# Patient Record
Sex: FEMALE | Race: WHITE | NOT HISPANIC OR LATINO | URBAN - METROPOLITAN AREA
[De-identification: names, ages, dates, MRNs, and addresses within clinical notes are randomized per-mention and may not be internally consistent; named-entity substitution may affect disease eponyms.]

---

## 2023-03-23 DIAGNOSIS — Z12.11 COLON CANCER SCREENING: Primary | ICD-10-CM

## 2023-03-28 DIAGNOSIS — Z12.11 COLON CANCER SCREENING: Primary | ICD-10-CM

## 2023-03-28 NOTE — TELEPHONE ENCOUNTER
----- Message from Shala Santiago sent at 3/28/2023  1:36 PM CDT -----  Regarding: Colonoscopy  Contact: patient  Per phone call with patient, she stated that she has been waiting on a return call for an appointment to have a colonoscopy to be done and no one has return her called.  The caller is wanting to make a compliant because she thinks that it is unprofessional for no one to call her back.  Please return call at [phone].    Thanks,  SJ

## 2023-04-10 VITALS — WEIGHT: 149 LBS | BODY MASS INDEX: 27.42 KG/M2 | HEIGHT: 62 IN

## 2023-04-10 RX ORDER — SEMAGLUTIDE 1.34 MG/ML
10 INJECTION, SOLUTION SUBCUTANEOUS
COMMUNITY

## 2023-04-10 RX ORDER — SPIRONOLACTONE 50 MG/1
50 TABLET, FILM COATED ORAL
COMMUNITY
Start: 2023-02-24

## 2023-04-10 RX ORDER — MELOXICAM 15 MG/1
TABLET ORAL
COMMUNITY
Start: 2023-03-14

## 2023-04-10 RX ORDER — PROGESTERONE 200 MG/1
200 CAPSULE ORAL NIGHTLY
COMMUNITY
Start: 2023-02-06

## 2023-04-10 RX ORDER — VALACYCLOVIR HYDROCHLORIDE 1 G/1
TABLET, FILM COATED ORAL
COMMUNITY
Start: 2023-03-27

## 2023-04-10 RX ORDER — LEVOTHYROXINE, LIOTHYRONINE 38; 9 UG/1; UG/1
60 TABLET ORAL
COMMUNITY
Start: 2023-02-06 | End: 2023-09-25

## 2023-04-10 NOTE — TELEPHONE ENCOUNTER
Returned call to pt and got her chart updated and scheduled. Pt voiced no hx of cpap, heart steat, or walking problem. Pt voiced understood information will be emailed and [email or web address].

## 2023-04-18 RX ORDER — SOD SULF/POT CHLORIDE/MAG SULF 1.479 G
12 TABLET ORAL DAILY
Qty: 24 TABLET | Refills: 0 | Status: SHIPPED | OUTPATIENT
Start: 2023-04-18 | End: 2023-09-25

## 2023-06-01 ENCOUNTER — TELEPHONE (OUTPATIENT)
Dept: HEMATOLOGY/ONCOLOGY | Facility: CLINIC | Age: 53
End: 2023-06-01
Payer: COMMERCIAL

## 2023-06-01 NOTE — TELEPHONE ENCOUNTER
Called the patient regarding referral. Patient did not answer. I was able to leave a VM requesting a call back. Direct number provided. TTRN

## 2023-06-06 ENCOUNTER — TELEPHONE (OUTPATIENT)
Dept: HEMATOLOGY/ONCOLOGY | Facility: CLINIC | Age: 53
End: 2023-06-06
Payer: COMMERCIAL

## 2023-06-06 NOTE — TELEPHONE ENCOUNTER
Called the patient regarding referral. Patient did not answer. I left a VM requesting a call back. Both my number as well as the clinic number was provided. TTRN

## 2023-06-15 VITALS — WEIGHT: 149 LBS | BODY MASS INDEX: 27.42 KG/M2 | HEIGHT: 62 IN

## 2023-06-15 DIAGNOSIS — Z12.11 COLON CANCER SCREENING: Primary | ICD-10-CM

## 2023-06-15 NOTE — PROGRESS NOTES
"[facility]  [address] [place] [address]  Phone: [phone]  Fax: [phone]    History & Physical         Provider: Dr. Loyda George    Patient Name: Cindy RUELAS (age):1970  52 y.o.           Gender: female   Phone: [phone]     Referring Physician: Amara Aranda (Inactive)     Vital Signs:   Height - 5' 2"  Weight - 149 lb  BMI -  27.25    Plan: Colonoscopy     Encounter Diagnosis   Name Primary?    Colon cancer screening Yes           History:      Past Medical History:   Diagnosis Date    ADD (attention deficit disorder)     BMI 27.0-27.9,adult     Disorder of thyroid, unspecified     Fever blister     IBS (irritable bowel syndrome)     Mixed hyperlipidemia     Neck pain     Type 2 diabetes mellitus with unspecified diabetic retinopathy without macular edema     Weight loss       Past Surgical History:   Procedure Laterality Date    APPENDECTOMY      BREAST SURGERY      FOOT SURGERY      HYSTERECTOMY      rotoar cuff & biceps repair Right     tumor on face         Medication List with Changes/Refills   Current Medications    MELOXICAM (MOBIC) 15 MG TABLET    TAKE 1 TABLET BY MOUTH ONCE DAILY WITH MEALS    NP THYROID 60 MG TAB    Take 60 mg by mouth.    PROGESTERONE (PROMETRIUM) 200 MG CAPSULE    Take 200 mg by mouth every evening. take at bedtime    SEMAGLUTIDE (OZEMPIC) 1 MG/DOSE (2 MG/1.5 ML) PNIJ    Inject 10 mg into the skin every 7 days.    SOD SULF-POT CHLORIDE-MAG SULF (SUTAB) 1.479-0.188- 0.225 GRAM TABLET    Take 12 tablets by mouth once daily. Take according to package instructions with indicated amount of water. No breakfast day before test. May substitute with Suprep, Clenpiq, Plenvu, Moviprep or GoLytely based on Rx plan and patient preference.    SPIRONOLACTONE (ALDACTONE) 50 MG TABLET    Take 50 mg by mouth.    VALACYCLOVIR (VALTREX) 1000 MG TABLET        "   Review of patient's allergies indicates:   Allergen Reactions    Valium [diazepam] Other (See Comments)     Critical she states she flipped out       Family History   Problem Relation Age of Onset    No Known Problems Father     No Known Problems Sister     No Known Problems Sister     No Known Problems Sister     No Known Problems Sister     No Known Problems Sister     No Known Problems Sister     No Known Problems Brother     No Known Problems Brother     No Known Problems Brother     No Known Problems Brother     No Known Problems Maternal Grandmother     No Known Problems Maternal Grandfather     No Known Problems Paternal Grandmother     No Known Problems Paternal Grandfather       Social History     Tobacco Use    Smoking status: Never    Smokeless tobacco: Never   Substance Use Topics    Alcohol use: Never    Drug use: Never        Physical Examination:     General Appearance:___________________________  HEENT: _____________________________________  Abdomen:____________________________________  Heart:________________________________________  Lungs:_______________________________________  Extremities:___________________________________  Skin:_________________________________________  Endocrine:____________________________________  Genitourinary:_________________________________  Neurological:__________________________________      Patient has been evaluated immediately prior to sedation and is medically cleared for endoscopy with IVCS as an ASA class: ______      Physician Signature: _________________________       Date: ________  Time: ________

## 2023-09-12 ENCOUNTER — TELEPHONE (OUTPATIENT)
Dept: HEMATOLOGY/ONCOLOGY | Facility: CLINIC | Age: 53
End: 2023-09-12

## 2023-09-12 ENCOUNTER — OFFICE VISIT (OUTPATIENT)
Dept: HEMATOLOGY/ONCOLOGY | Facility: CLINIC | Age: 53
End: 2023-09-12
Payer: COMMERCIAL

## 2023-09-12 VITALS
DIASTOLIC BLOOD PRESSURE: 85 MMHG | RESPIRATION RATE: 18 BRPM | HEART RATE: 92 BPM | BODY MASS INDEX: 25.03 KG/M2 | HEIGHT: 62 IN | WEIGHT: 136 LBS | OXYGEN SATURATION: 98 % | SYSTOLIC BLOOD PRESSURE: 120 MMHG

## 2023-09-12 DIAGNOSIS — Z15.02 BRCA2 GENE MUTATION POSITIVE IN FEMALE: Primary | ICD-10-CM

## 2023-09-12 DIAGNOSIS — Z15.09 BRCA2 GENE MUTATION POSITIVE IN FEMALE: Primary | ICD-10-CM

## 2023-09-12 DIAGNOSIS — Z15.01 BRCA2 GENE MUTATION POSITIVE IN FEMALE: Primary | ICD-10-CM

## 2023-09-12 PROCEDURE — 3008F BODY MASS INDEX DOCD: CPT | Mod: CPTII,S$GLB,, | Performed by: NURSE PRACTITIONER

## 2023-09-12 PROCEDURE — 3079F DIAST BP 80-89 MM HG: CPT | Mod: CPTII,S$GLB,, | Performed by: NURSE PRACTITIONER

## 2023-09-12 PROCEDURE — 1159F PR MEDICATION LIST DOCUMENTED IN MEDICAL RECORD: ICD-10-PCS | Mod: CPTII,S$GLB,, | Performed by: NURSE PRACTITIONER

## 2023-09-12 PROCEDURE — 1160F RVW MEDS BY RX/DR IN RCRD: CPT | Mod: CPTII,S$GLB,, | Performed by: NURSE PRACTITIONER

## 2023-09-12 PROCEDURE — 99205 PR OFFICE/OUTPT VISIT, NEW, LEVL V, 60-74 MIN: ICD-10-PCS | Mod: S$GLB,,, | Performed by: NURSE PRACTITIONER

## 2023-09-12 PROCEDURE — 3008F PR BODY MASS INDEX (BMI) DOCUMENTED: ICD-10-PCS | Mod: CPTII,S$GLB,, | Performed by: NURSE PRACTITIONER

## 2023-09-12 PROCEDURE — 3074F PR MOST RECENT SYSTOLIC BLOOD PRESSURE < 130 MM HG: ICD-10-PCS | Mod: CPTII,S$GLB,, | Performed by: NURSE PRACTITIONER

## 2023-09-12 PROCEDURE — 1159F MED LIST DOCD IN RCRD: CPT | Mod: CPTII,S$GLB,, | Performed by: NURSE PRACTITIONER

## 2023-09-12 PROCEDURE — 99205 OFFICE O/P NEW HI 60 MIN: CPT | Mod: S$GLB,,, | Performed by: NURSE PRACTITIONER

## 2023-09-12 PROCEDURE — 3074F SYST BP LT 130 MM HG: CPT | Mod: CPTII,S$GLB,, | Performed by: NURSE PRACTITIONER

## 2023-09-12 PROCEDURE — 3079F PR MOST RECENT DIASTOLIC BLOOD PRESSURE 80-89 MM HG: ICD-10-PCS | Mod: CPTII,S$GLB,, | Performed by: NURSE PRACTITIONER

## 2023-09-12 PROCEDURE — 1160F PR REVIEW ALL MEDS BY PRESCRIBER/CLIN PHARMACIST DOCUMENTED: ICD-10-PCS | Mod: CPTII,S$GLB,, | Performed by: NURSE PRACTITIONER

## 2023-09-12 NOTE — LETTER
September 13, 2023        Amara Aranda MD  [facility] [address] [place] [address]             [facility]  [address]  [place] [address]  Phone: [phone]  Fax: [phone]   Patient: Cindy Novoa   MR Number: [id number]   YOB: 1970   Date of Visit: 9/12/2023       Dear Dr. Aranda:    Thank you for referring Cindy Novoa to me for evaluation. Attached you will find relevant portions of my assessment and plan of care.    If you have questions, please do not hesitate to call me. I look forward to following Cindy Novoa along with you.    Sincerely,      Yenny Saleem, YUNIEL            CC    No Recipients    Enclosure

## 2023-09-12 NOTE — PROGRESS NOTES
Subjective:      Patient ID: Cindy Novoa is a 52 y.o. female.        Chief Complaint: No chief complaint on file.    Cindy Novoa  has been referred to our clinic today to discuss results of recent [employer] genetic testing done on 4/21/2023. Results of testing show a BRCA 2 gene mutation (c.1813dupA) which is associated with the hereditary Breast and Ovarian Cancer Syndrome (HBOC). Patient states she has a strong family history of male breast cancer and prostate in her maternal grandfather, and maternal uncle as well as breast and  ovarian cancer in her maternal grandmother, which lead to her testing.     Today we discussed her associated risks for developing breast, ovarian, melanoma and pancreatic cancers linked to BRCA 2 mutations.       IMAGING:      Review of systems:  Review of Systems   Constitutional:  Negative for activity change, appetite change, chills, diaphoresis, fatigue, fever and unexpected weight change.   HENT:  Negative for congestion, dental problem, mouth sores, nosebleeds, sore throat, tinnitus and voice change.    Eyes:  Negative for photophobia, discharge and visual disturbance.   Respiratory:  Negative for apnea, cough, choking, chest tightness, shortness of breath, wheezing and stridor.    Cardiovascular:  Negative for chest pain, palpitations and leg swelling.   Gastrointestinal:  Negative for abdominal distention, abdominal pain, anal bleeding, blood in stool, constipation, diarrhea, nausea, rectal pain and vomiting.   Endocrine: Negative for cold intolerance and heat intolerance.   Genitourinary:  Negative for difficulty urinating, dysuria, hematuria, menstrual problem, vaginal bleeding, vaginal discharge and vaginal pain.   Musculoskeletal:  Negative for arthralgias, back pain, gait problem, joint swelling and myalgias.   Skin:  Negative for color change, pallor, rash and wound.   Neurological:  Negative for dizziness, syncope, light-headedness and numbness.    Hematological:  Negative for adenopathy. Does not bruise/bleed easily.   Psychiatric/Behavioral:  Negative for agitation, confusion, sleep disturbance and suicidal ideas. The patient is not nervous/anxious.        Objective:     Physical Exam  Constitutional:       Appearance: She is well-developed.   HENT:      Head: Normocephalic.   Eyes:      Conjunctiva/sclera: Conjunctivae normal.      Pupils: Pupils are equal, round, and reactive to light.   Cardiovascular:      Rate and Rhythm: Normal rate and regular rhythm.      Heart sounds: Normal heart sounds.   Pulmonary:      Effort: Pulmonary effort is normal.      Breath sounds: Normal breath sounds.   Chest:      Chest wall: No mass, deformity or tenderness.   Breasts:     Breasts are symmetrical.   Abdominal:      General: Bowel sounds are normal.      Palpations: Abdomen is soft.   Musculoskeletal:         General: Normal range of motion.      Cervical back: Normal range of motion and neck supple.   Skin:     General: Skin is warm and dry.   Neurological:      Mental Status: She is alert and oriented to person, place, and time.   Psychiatric:         Behavior: Behavior normal.         Thought Content: Thought content normal.         Judgment: Judgment normal.       Vitals:    09/12/23 1525   BP: 120/85   Pulse: 92   Resp: 18           Assessment:      1. BRCA2 gene mutation positive in female           Plan:   BRCA 2 related breast cancer risk Lifetime risk  43-84%  Recommend Breast MRI beginning at age 25, will mammograms beginning at age 30.She is up to date on mammogram from 4/2023, cat 2  CBE beginning at age 27   Consider risk reducing mastectomy  Discussed risk reducing chemo prevention guidelines including anti hormonal therapy with tamoxifen.        BRCA2 related ovarian cancer risk Lifetime risk 15-27%  Recommended  TV ultrasound and Ca 125 monitoring yearly beginning at age 30-35  Recommend bilateral salpingo-oophorectomy vs oral contraceptives upon  the completion of childbearing History of partial hysterectomy  Consideer other risk reducing agents like oral contraceptives       3.  BRCA2 related pancreatic cancer risk 7%              A. discuss EUS/MRCP yearly screening for pancreatic cancer to begin at age 50.                B. Encouraged to reduce  pancreatic cancer risk by not smoking and maintaining a healthy weight     4. BRCA2 related skin cancer risk              A. Will refer patient to Dermatology for yearly skin evaluation Seen by [facility]               B. Advised to follow-up with ophthalmology for yearly eye examination seen by eye clinic              C. Encouraged to limit UV exposure  6. Genetic testing for family members recommended    Orders:  Refer for bilateral Breast MRI  Refer to GYN ONC to discuss surgical options  Transvaginal U/S ordered  Cbc Cmp Ca 125   Will refer to GI specialist of choice at next appointment  She is instructed to stop progesterone cream and estradiol pellet at this time. If she proceeds with complete hysterectomy she may resume HRT afterwards.     RTC 1 month with MRI and labs      -Total time spent in counseling and discussion about further management options including relevant lab work, treatment,  prognosis, medications and intended side effects was more than 60 minutes. More than 50% of the time was spent on counseling and coordination of care.  This includes face to face time and non-face to face time preparing to see the patient (eg, review of tests), Obtaining and/or reviewing separately obtained history, Documenting clinical information in the electronic or other health record, Independently interpreting resultsand communicating results to the patient/family/caregiver, or Care coordination.     Yenny Saleem, WARD, JULY

## 2023-09-13 ENCOUNTER — TELEPHONE (OUTPATIENT)
Dept: HEMATOLOGY/ONCOLOGY | Facility: CLINIC | Age: 53
End: 2023-09-13
Payer: COMMERCIAL

## 2023-09-15 ENCOUNTER — TELEPHONE (OUTPATIENT)
Dept: HEMATOLOGY/ONCOLOGY | Facility: CLINIC | Age: 53
End: 2023-09-15
Payer: COMMERCIAL

## 2023-09-15 NOTE — TELEPHONE ENCOUNTER
----- Message from Alma Misty sent at 9/15/2023 11:28 AM CDT -----  Contact: Patient  Patient called to consult with nurse or staff regarding her referral to see gynecology/oncologist. She states she wanted to she would like to be referred to Son instead of Maynor Leggett. She would like a call back and can be reached at [phone]. Thanks/MR

## 2023-09-17 ENCOUNTER — TELEPHONE (OUTPATIENT)
Dept: GASTROENTEROLOGY | Facility: CLINIC | Age: 53
End: 2023-09-17

## 2023-09-17 DIAGNOSIS — Z12.11 COLON CANCER SCREENING: Primary | ICD-10-CM

## 2023-09-18 NOTE — TELEPHONE ENCOUNTER
Chart note received from Hem/Onc. Patient was scheduled for screening colonoscopy but it does not seem it was completed. Okay to reschedule.  NBP

## 2023-09-20 ENCOUNTER — TELEPHONE (OUTPATIENT)
Dept: HEMATOLOGY/ONCOLOGY | Facility: CLINIC | Age: 53
End: 2023-09-20
Payer: COMMERCIAL

## 2023-09-20 DIAGNOSIS — Z15.02 BRCA2 GENE MUTATION POSITIVE IN FEMALE: Primary | ICD-10-CM

## 2023-09-20 DIAGNOSIS — Z15.09 BRCA2 GENE MUTATION POSITIVE IN FEMALE: Primary | ICD-10-CM

## 2023-09-20 DIAGNOSIS — Z15.01 BRCA2 GENE MUTATION POSITIVE IN FEMALE: Primary | ICD-10-CM

## 2023-09-21 ENCOUNTER — TELEPHONE (OUTPATIENT)
Dept: GYNECOLOGIC ONCOLOGY | Facility: CLINIC | Age: 53
End: 2023-09-21

## 2023-09-21 NOTE — NURSING
New pt referral received from Stiven BRICE for pt to be seen with GYN/ONC to discuss BRCA2+ gene mutation results. LVM with for a call back to arrange new pt appointment. Direct navigator phone number provided to pt [phone]

## 2023-09-25 VITALS — WEIGHT: 132 LBS | BODY MASS INDEX: 24.29 KG/M2 | HEIGHT: 62 IN

## 2023-09-25 RX ORDER — SOD SULF/POT CHLORIDE/MAG SULF 1.479 G
12 TABLET ORAL DAILY
Qty: 24 TABLET | Refills: 0 | Status: CANCELLED | OUTPATIENT
Start: 2023-09-25

## 2023-09-25 RX ORDER — LEVOTHYROXINE, LIOTHYRONINE 57; 13.5 UG/1; UG/1
TABLET ORAL
COMMUNITY
Start: 2023-08-05

## 2023-09-25 NOTE — TELEPHONE ENCOUNTER
Patient denied having any current problems or issues. Said she's had ibs-c for years and she uses miralax on a regular basis. Patient denied having any hx of kidney disease, sleep apnea, or seizures.     Patient takes ozempic for pre-diabetes and weight gain.    Chart was reviewed and updated with patient. Prep instructions were reviewed again. Patient advised she still have the prep instructions so I didn't need to email them to her.     Patient asked for the liquid prep. She is able to force that down better than the pills.    Colonoscopy scheduled for 12/13/2023 w/ NBP. - dmp

## 2023-09-26 RX ORDER — SODIUM, POTASSIUM,MAG SULFATES 17.5-3.13G
SOLUTION, RECONSTITUTED, ORAL ORAL
Qty: 1 KIT | Refills: 0 | Status: SHIPPED | OUTPATIENT
Start: 2023-09-26 | End: 2023-12-28

## 2023-10-06 ENCOUNTER — PATIENT MESSAGE (OUTPATIENT)
Dept: HEMATOLOGY/ONCOLOGY | Facility: CLINIC | Age: 53
End: 2023-10-06
Payer: COMMERCIAL

## 2023-10-06 ENCOUNTER — TELEPHONE (OUTPATIENT)
Dept: HEMATOLOGY/ONCOLOGY | Facility: CLINIC | Age: 53
End: 2023-10-06
Payer: COMMERCIAL

## 2023-10-11 LAB
ALBUMIN SERPL BCP-MCNC: 4.1 G/DL (ref 3.4–5)
ALBUMIN/GLOBULIN RATIO: 1.37 RATIO (ref 1.1–1.8)
ALP SERPL-CCNC: 61 U/L (ref 46–116)
ALT SERPL W P-5'-P-CCNC: 26 U/L (ref 12–78)
ANION GAP SERPL CALC-SCNC: 5 MMOL/L (ref 3–11)
AST SERPL-CCNC: 26 U/L (ref 15–37)
BASOPHILS NFR BLD: 0.3 % (ref 0–3)
BILIRUB SERPL-MCNC: 0.8 MG/DL (ref 0–1)
BUN SERPL-MCNC: 13 MG/DL (ref 7–18)
BUN/CREAT SERPL: 22.8 RATIO (ref 7–18)
CALCIUM SERPL-MCNC: 9.5 MG/DL (ref 8.8–10.5)
CHLORIDE SERPL-SCNC: 104 MMOL/L (ref 100–108)
CO2 SERPL-SCNC: 29 MMOL/L (ref 21–32)
CREAT SERPL-MCNC: 0.57 MG/DL (ref 0.55–1.02)
EOSINOPHIL NFR BLD: 2.6 % (ref 1–3)
ERYTHROCYTE [DISTWIDTH] IN BLOOD BY AUTOMATED COUNT: 12.1 % (ref 12.5–18)
GFR ESTIMATION: > 60
GLOBULIN: 3 G/DL (ref 2.3–3.5)
GLUCOSE SERPL-MCNC: 78 MG/DL (ref 70–110)
HCT VFR BLD AUTO: 41.5 % (ref 37–47)
HGB BLD-MCNC: 13.5 G/DL (ref 12–16)
LYMPHOCYTES NFR BLD: 29.2 % (ref 25–40)
MCH RBC QN AUTO: 30.5 PG (ref 27–31.2)
MCHC RBC AUTO-ENTMCNC: 32.5 G/DL (ref 31.8–35.4)
MCV RBC AUTO: 93.7 FL (ref 80–97)
MONOCYTES NFR BLD: 8.5 % (ref 1–15)
NEUTROPHILS # BLD AUTO: 3.83 10*3/UL (ref 1.8–7.7)
NEUTROPHILS NFR BLD: 58.9 % (ref 37–80)
NUCLEATED RED BLOOD CELLS: 0 %
PLATELETS: 287 10*3/UL (ref 142–424)
POTASSIUM SERPL-SCNC: 4.5 MMOL/L (ref 3.6–5.2)
PROT SERPL-MCNC: 7.1 G/DL (ref 6.4–8.2)
RBC # BLD AUTO: 4.43 10*6/UL (ref 4.2–5.4)
SODIUM BLD-SCNC: 138 MMOL/L (ref 135–145)
WBC # BLD: 6.5 10*3/UL (ref 4.6–10.2)

## 2023-10-13 LAB — CANCER ANTIGEN (CA) 125: 7 U/ML

## 2023-10-16 NOTE — NURSING
Pt called to schedule from referral and LVM with direct navigator number [phone] for call back

## 2023-10-20 PROBLEM — Z15.01 BRCA2 GENE MUTATION POSITIVE IN FEMALE: Status: ACTIVE | Noted: 2023-10-20

## 2023-10-20 PROBLEM — Z15.09 BRCA2 GENE MUTATION POSITIVE IN FEMALE: Status: ACTIVE | Noted: 2023-10-20

## 2023-10-20 PROBLEM — Z15.02 BRCA2 GENE MUTATION POSITIVE IN FEMALE: Status: ACTIVE | Noted: 2023-10-20

## 2023-10-23 ENCOUNTER — OFFICE VISIT (OUTPATIENT)
Dept: HEMATOLOGY/ONCOLOGY | Facility: CLINIC | Age: 53
End: 2023-10-23
Payer: COMMERCIAL

## 2023-10-23 VITALS
SYSTOLIC BLOOD PRESSURE: 122 MMHG | DIASTOLIC BLOOD PRESSURE: 83 MMHG | WEIGHT: 140.5 LBS | HEART RATE: 73 BPM | OXYGEN SATURATION: 97 % | RESPIRATION RATE: 18 BRPM | HEIGHT: 62 IN | BODY MASS INDEX: 25.86 KG/M2

## 2023-10-23 DIAGNOSIS — Z15.09 BRCA2 GENE MUTATION POSITIVE IN FEMALE: Primary | ICD-10-CM

## 2023-10-23 DIAGNOSIS — Z15.02 BRCA2 GENE MUTATION POSITIVE IN FEMALE: Primary | ICD-10-CM

## 2023-10-23 DIAGNOSIS — Z15.01 BRCA2 GENE MUTATION POSITIVE IN FEMALE: Primary | ICD-10-CM

## 2023-10-23 PROCEDURE — 3079F PR MOST RECENT DIASTOLIC BLOOD PRESSURE 80-89 MM HG: ICD-10-PCS | Mod: CPTII,S$GLB,, | Performed by: NURSE PRACTITIONER

## 2023-10-23 PROCEDURE — 3074F PR MOST RECENT SYSTOLIC BLOOD PRESSURE < 130 MM HG: ICD-10-PCS | Mod: CPTII,S$GLB,, | Performed by: NURSE PRACTITIONER

## 2023-10-23 PROCEDURE — 1159F PR MEDICATION LIST DOCUMENTED IN MEDICAL RECORD: ICD-10-PCS | Mod: CPTII,S$GLB,, | Performed by: NURSE PRACTITIONER

## 2023-10-23 PROCEDURE — 1159F MED LIST DOCD IN RCRD: CPT | Mod: CPTII,S$GLB,, | Performed by: NURSE PRACTITIONER

## 2023-10-23 PROCEDURE — 1160F RVW MEDS BY RX/DR IN RCRD: CPT | Mod: CPTII,S$GLB,, | Performed by: NURSE PRACTITIONER

## 2023-10-23 PROCEDURE — 1160F PR REVIEW ALL MEDS BY PRESCRIBER/CLIN PHARMACIST DOCUMENTED: ICD-10-PCS | Mod: CPTII,S$GLB,, | Performed by: NURSE PRACTITIONER

## 2023-10-23 PROCEDURE — 3008F PR BODY MASS INDEX (BMI) DOCUMENTED: ICD-10-PCS | Mod: CPTII,S$GLB,, | Performed by: NURSE PRACTITIONER

## 2023-10-23 PROCEDURE — 99214 OFFICE O/P EST MOD 30 MIN: CPT | Mod: S$GLB,,, | Performed by: NURSE PRACTITIONER

## 2023-10-23 PROCEDURE — 3074F SYST BP LT 130 MM HG: CPT | Mod: CPTII,S$GLB,, | Performed by: NURSE PRACTITIONER

## 2023-10-23 PROCEDURE — 3008F BODY MASS INDEX DOCD: CPT | Mod: CPTII,S$GLB,, | Performed by: NURSE PRACTITIONER

## 2023-10-23 PROCEDURE — 3079F DIAST BP 80-89 MM HG: CPT | Mod: CPTII,S$GLB,, | Performed by: NURSE PRACTITIONER

## 2023-10-23 PROCEDURE — 99214 PR OFFICE/OUTPT VISIT, EST, LEVL IV, 30-39 MIN: ICD-10-PCS | Mod: S$GLB,,, | Performed by: NURSE PRACTITIONER

## 2023-10-23 NOTE — PROGRESS NOTES
Subjective:      Patient ID: Cindy Novoa is a 52 y.o. female.        Chief Complaint: BRCA2 gene mutation positive in female    Cindy Novoa  has been referred to our clinic today to discuss results of recent [employer] genetic testing done on 4/21/2023. Results of testing show a BRCA 2 gene mutation (c.1813dupA) which is associated with the hereditary Breast and Ovarian Cancer Syndrome (HBOC). Patient states she has a strong family history of male breast cancer and prostate in her maternal grandfather, and maternal uncle as well as breast and  ovarian cancer in her maternal grandmother, which lead to her testing.     Today we discussed her associated risks for developing breast, ovarian, melanoma and pancreatic cancers linked to BRCA 2 mutations.       IMAGING:      Review of systems:  Review of Systems   Constitutional:  Negative for activity change, appetite change, chills, diaphoresis, fatigue, fever and unexpected weight change.   HENT:  Negative for congestion, dental problem, mouth sores, nosebleeds, sore throat, tinnitus and voice change.    Eyes:  Negative for photophobia, discharge and visual disturbance.   Respiratory:  Negative for apnea, cough, choking, chest tightness, shortness of breath, wheezing and stridor.    Cardiovascular:  Negative for chest pain, palpitations and leg swelling.   Gastrointestinal:  Negative for abdominal distention, abdominal pain, anal bleeding, blood in stool, constipation, diarrhea, nausea, rectal pain and vomiting.   Endocrine: Negative for cold intolerance and heat intolerance.   Genitourinary:  Negative for difficulty urinating, dysuria, hematuria, menstrual problem, vaginal bleeding, vaginal discharge and vaginal pain.   Musculoskeletal:  Negative for arthralgias, back pain, gait problem, joint swelling and myalgias.   Skin:  Negative for color change, pallor, rash and wound.   Neurological:  Negative for dizziness, syncope, light-headedness and numbness.    Hematological:  Negative for adenopathy. Does not bruise/bleed easily.   Psychiatric/Behavioral:  Negative for agitation, confusion, sleep disturbance and suicidal ideas. The patient is not nervous/anxious.        Objective:     Physical Exam  Constitutional:       Appearance: She is well-developed.   HENT:      Head: Normocephalic.   Eyes:      Conjunctiva/sclera: Conjunctivae normal.      Pupils: Pupils are equal, round, and reactive to light.   Cardiovascular:      Rate and Rhythm: Normal rate and regular rhythm.      Heart sounds: Normal heart sounds.   Pulmonary:      Effort: Pulmonary effort is normal.      Breath sounds: Normal breath sounds.   Chest:      Chest wall: No mass, deformity or tenderness.   Breasts:     Breasts are symmetrical.   Abdominal:      General: Bowel sounds are normal.      Palpations: Abdomen is soft.   Musculoskeletal:         General: Normal range of motion.      Cervical back: Normal range of motion and neck supple.   Skin:     General: Skin is warm and dry.   Neurological:      Mental Status: She is alert and oriented to person, place, and time.   Psychiatric:         Behavior: Behavior normal.         Thought Content: Thought content normal.         Judgment: Judgment normal.       Vitals:    10/23/23 1525   BP: 122/83   Pulse: 73   Resp: 18             Assessment:      1. BRCA2 gene mutation positive in female               Plan:   BRCA 2 related breast cancer risk Lifetime risk  43-84%  Recommend Breast MRI beginning at age 25, will mammograms beginning at age 30.She is up to date on mammogram from 4/2023, cat 2  CBE beginning at age 25   Consider risk reducing mastectomy  Discussed risk reducing chemo prevention guidelines including anti hormonal therapy with tamoxifen.        BRCA2 related ovarian cancer risk Lifetime risk 15-27%  Recommended  TV ultrasound and Ca 125 monitoring yearly beginning at age 30-35  Recommend bilateral salpingo-oophorectomy vs oral contraceptives  upon the completion of childbearing History of partial hysterectomy  Consideer other risk reducing agents like oral contraceptives       3.  BRCA2 related pancreatic cancer risk 7%              A. discuss EUS/MRCP yearly screening for pancreatic cancer to begin at age 50.                B. Encouraged to reduce  pancreatic cancer risk by not smoking and maintaining a healthy weight     4. BRCA2 related skin cancer risk              A. Will refer patient to Dermatology for yearly skin evaluation Seen by [facility]               B. Advised to follow-up with ophthalmology for yearly eye examination seen by eye clinic              C. Encouraged to limit UV exposure  5. Genetic testing for family members recommended    Orders:  Refer for bilateral Breast MRI, pending   Refer to GYN ONC to discuss surgical options  Transvaginal U/S ordered  Cbc Cmp Ca 125   Will refer to GI specialist of choice at next appointment c-scope on 12/13/23 with Dr George   She is instructed to stop progesterone cream and estradiol pellet at this time. If she proceeds with complete hysterectomy she may resume HRT afterwards.     RTC 2 month after 2 months   Planned for [facility] appt on 11/7/23 with gyn onc     -Total time spent in counseling and discussion about further management options including relevant lab work, treatment,  prognosis, medications and intended side effects was more than 60 minutes. More than 50% of the time was spent on counseling and coordination of care.  This includes face to face time and non-face to face time preparing to see the patient (eg, review of tests), Obtaining and/or reviewing separately obtained history, Documenting clinical information in the electronic or other health record, Independently interpreting resultsand communicating results to the patient/family/caregiver, or Care coordination.     WARD Mai, JULY

## 2023-11-14 ENCOUNTER — TELEPHONE (OUTPATIENT)
Dept: HEMATOLOGY/ONCOLOGY | Facility: CLINIC | Age: 53
End: 2023-11-14
Payer: COMMERCIAL

## 2023-11-14 NOTE — TELEPHONE ENCOUNTER
Spoke with pt, canceled appt           ----- Message from Shala Santiago sent at 11/14/2023  2:30 PM CST -----  Regarding: Cancellation  Contact: Patient  Per phone call with patient, she stated to cancel the appointment on 12/27/2023 because she will be seen at [facility] on 12/01/23 and 12/05/2023 and she will call with follow up.  Please return call at [phone] (home).    Thanks,  SJ

## 2023-11-22 NOTE — TELEPHONE ENCOUNTER
Navigator notes in chart that pt seeking care at [facility]. Referral closed

## 2023-12-04 ENCOUNTER — TELEPHONE (OUTPATIENT)
Dept: GASTROENTEROLOGY | Facility: CLINIC | Age: 53
End: 2023-12-04
Payer: COMMERCIAL

## 2023-12-04 VITALS — BODY MASS INDEX: 25.76 KG/M2 | HEIGHT: 62 IN | WEIGHT: 140 LBS

## 2023-12-04 DIAGNOSIS — Z12.11 COLON CANCER SCREENING: Primary | ICD-10-CM

## 2023-12-04 NOTE — TELEPHONE ENCOUNTER
[facility]  [address] [place] [address]  Phone: [phone]  Fax: [phone]    History & Physical         Provider: Dr. Loyda George    Patient Name: Cindy RUELAS (age):1970  53 y.o.           Gender: female   Phone: [phone]     Referring Physician: Amara Aranda (Inactive)     Vital Signs:   Height - 5'2''  Weight - 140 LB  BMI -  25.61    Plan: Colonoscopy @ [facility]     Encounter Diagnosis   Name Primary?    Colon cancer screening Yes           History:      Past Medical History:   Diagnosis Date    BMI 24.14 2023    BRCA2 gene mutation positive     c.1813dupA    Disorder of thyroid, unspecified     Fever blister     Hormone imbalance     IBS (irritable bowel syndrome)     Mixed hyperlipidemia     Neck pain     Pre-diabetes       Past Surgical History:   Procedure Laterality Date    APPENDECTOMY      AUGMENTATION OF BREAST      and in  - one had ruptured    BREAST SURGERY      FOOT SURGERY  1987    HYSTERECTOMY      rotoar cuff & biceps repair Right 2022    tumor on face         Medication List with Changes/Refills   Current Medications    MELOXICAM (MOBIC) 15 MG TABLET    TAKE 1 TABLET BY MOUTH ONCE DAILY WITH MEALS    NP THYROID 90 MG TAB    TAKE 1 TABLET BY MOUTH IN THE MORNING ON AN EMPTY STOMACH    PROGESTERONE (PROMETRIUM) 200 MG CAPSULE    Take 200 mg by mouth every evening. take at bedtime    SEMAGLUTIDE (OZEMPIC) 1 MG/DOSE (2 MG/1.5 ML) PNIJ    Inject 10 mg into the skin every 7 days.    SODIUM,POTASSIUM,MAG SULFATES (SUPREP BOWEL PREP KIT) 17.5-3.13-1.6 GRAM SOLR    Take according to kit instructions but DO NOT eat breakfast the morning of procedure.    SPIRONOLACTONE (ALDACTONE) 50 MG TABLET    Take 50 mg by mouth.    VALACYCLOVIR (VALTREX) 1000 MG TABLET          Review of patient's allergies indicates:   Allergen Reactions    Valium  [diazepam] Other (See Comments)     Critical she states she flipped out       Family History   Problem Relation Age of Onset    Heart attack Father     No Known Problems Sister     No Known Problems Sister     No Known Problems Sister     No Known Problems Sister     No Known Problems Sister     No Known Problems Sister     No Known Problems Brother     No Known Problems Brother     No Known Problems Brother     No Known Problems Brother     No Known Problems Maternal Grandmother     No Known Problems Maternal Grandfather     No Known Problems Paternal Grandmother     No Known Problems Paternal Grandfather       Social History     Tobacco Use    Smoking status: Never    Smokeless tobacco: Never   Substance Use Topics    Alcohol use: Yes     Comment: seldomly    Drug use: Never        Physical Examination:     General Appearance:___________________________  HEENT: _____________________________________  Abdomen:____________________________________  Heart:________________________________________  Lungs:_______________________________________  Extremities:___________________________________  Skin:_________________________________________  Endocrine:____________________________________  Genitourinary:_________________________________  Neurological:__________________________________      Patient has been evaluated immediately prior to sedation and is medically cleared for endoscopy with IVCS as an ASA class: ______      Physician Signature: _________________________       Date: ________  Time: ________

## 2023-12-11 NOTE — TELEPHONE ENCOUNTER
S/w pt and told her that I was calling as a courtesy regarding her upcoming Colon with NBP on 12/12/13 Wed and wanted to verify that he has his prep instructions and meds. Pt stated she both. I also reminded her to not take Ozempic. Pt last injection was last Monday.  I also mentioned that [facility]  will call on Tues with the arrival time. dayanara

## 2023-12-18 ENCOUNTER — PATIENT MESSAGE (OUTPATIENT)
Dept: GASTROENTEROLOGY | Facility: CLINIC | Age: 53
End: 2023-12-18
Payer: COMMERCIAL

## 2023-12-18 NOTE — TELEPHONE ENCOUNTER
We do not have any availability before that date. She should know that we are booking in 5/2024 for procedures. We can always put her on a cancellation list if something opens up. If she wants to have an OV to discuss her GI/IBS-C symptoms she may have that as well.   ANJU

## 2023-12-27 NOTE — PROGRESS NOTES
Clinic Note    Reason for visit:  The primary encounter diagnosis was Irritable bowel syndrome with constipation. A diagnosis of Colon cancer screening was also pertinent to this visit.    PCP: Amara Aranda (Inactive)       HPI:  This is a 53 y.o. female who is established. [place] with IBS-C. Patients mother passed so was unable to have colonoscopy. Had IBS-D for years but now has consipation. Takes laxatative and stool softners daily. She is aware of certain foods that may trigger IBS symptoms. Rice and bread is a trigger. She also will have severe bloating, indigestion if does not have BM daily. She will often have to vomit for relief. No blood in stool.    She is BRCA2+. Had MRI showing 0.3cm focus enhancement to R Breast. She goes for biopsy next week.     Did see Gunjan years ago for IBS C Had colonoscopy which was normal      Review of Systems   Constitutional:  Negative for fatigue, fever and unexpected weight change.   HENT:  Negative for mouth sores, postnasal drip, sore throat and trouble swallowing.    Eyes:  Negative for pain, discharge and eye dryness.   Respiratory:  Negative for apnea, cough, choking, chest tightness, shortness of breath and wheezing.    Cardiovascular:  Negative for chest pain, palpitations and leg swelling.   Gastrointestinal:  Positive for constipation and reflux. Negative for abdominal distention, abdominal pain, anal bleeding, blood in stool, change in bowel habit, diarrhea, nausea, rectal pain, vomiting and fecal incontinence.   Genitourinary:  Negative for bladder incontinence, dysuria and hematuria.   Musculoskeletal:  Negative for arthralgias, back pain and joint swelling.   Integumentary:  Negative for color change and rash.   Allergic/Immunologic: Negative for environmental allergies and food allergies.   Neurological:  Negative for seizures and headaches.   Hematological:  Negative for adenopathy. Does not bruise/bleed easily.        Past Medical History:    Diagnosis Date    BMI 24.14 09/2023    BRCA2 gene mutation positive     [id number]    Disorder of thyroid, unspecified     Fever blister     Hormone imbalance     IBS (irritable bowel syndrome)     Mixed hyperlipidemia     Neck pain     Pre-diabetes      Past Surgical History:   Procedure Laterality Date    APPENDECTOMY  1987    AUGMENTATION OF BREAST  2001    and in 2009 - one had ruptured    BREAST SURGERY      COLONOSCOPY      FOOT SURGERY Bilateral 1987    HYSTERECTOMY  2007    partial    rotoar cuff & biceps repair Right 08/2022    tumor on face   2005     Family History   Problem Relation Age of Onset    Lung cancer Mother     Heart attack Father     No Known Problems Sister     No Known Problems Sister     No Known Problems Sister     No Known Problems Sister     No Known Problems Sister     No Known Problems Sister     No Known Problems Brother     No Known Problems Brother     No Known Problems Brother     No Known Problems Brother     Breast cancer Maternal Uncle     Breast cancer Maternal Grandmother     Ovarian cancer Maternal Grandmother     Breast cancer Maternal Grandfather     Prostate cancer Maternal Grandfather     Ovarian cancer Paternal Grandmother     No Known Problems Paternal Grandfather      Social History     Tobacco Use    Smoking status: Never    Smokeless tobacco: Never   Substance Use Topics    Alcohol use: Yes     Comment: seldomly    Drug use: Never     Review of patient's allergies indicates:   Allergen Reactions    Valium [diazepam] Other (See Comments)     Critical she states she flipped out       Medication List with Changes/Refills   Current Medications    ESTROGENS, CONJUGATED, (PREMARIN) 0.3 MG TABLET    Take 0.3 mg by mouth once daily.    MELOXICAM (MOBIC) 15 MG TABLET    TAKE 1 TABLET BY MOUTH ONCE DAILY WITH MEALS    NP THYROID 90 MG TAB    TAKE 1 TABLET BY MOUTH IN THE MORNING ON AN EMPTY STOMACH    PROGESTERONE (PROMETRIUM) 200 MG CAPSULE    Take 200 mg by mouth every  "evening. take at bedtime    SEMAGLUTIDE (OZEMPIC) 1 MG/DOSE (2 MG/1.5 ML) PNIJ    Inject 10 mg into the skin every 7 days.    SODIUM,POTASSIUM,MAG SULFATES (SUPREP BOWEL PREP KIT) 17.5-3.13-1.6 GRAM SOLR    Take according to kit instructions but DO NOT eat breakfast the morning of procedure.    SPIRONOLACTONE (ALDACTONE) 50 MG TABLET    Take 50 mg by mouth.    TESTOSTERONE 100 MG PELLET    Inject 100 mg into the muscle.    VALACYCLOVIR (VALTREX) 1000 MG TABLET             Vital Signs:  /86   Pulse 88   Ht 5' 2" (1.575 m)   Wt 62.6 kg (138 lb)   SpO2 98%   BMI 25.24 kg/m²        Physical Exam  Vitals reviewed.   Constitutional:       General: She is awake. She is not in acute distress.     Appearance: Normal appearance. She is well-developed. She is not ill-appearing, toxic-appearing or diaphoretic.   HENT:      Head: Normocephalic and atraumatic.      Nose: Nose normal.      Mouth/Throat:      Mouth: Mucous membranes are moist.      Pharynx: Oropharynx is clear. No oropharyngeal exudate or posterior oropharyngeal erythema.   Eyes:      General: Lids are normal. Gaze aligned appropriately. No scleral icterus.        Right eye: No discharge.         Left eye: No discharge.      Conjunctiva/sclera: Conjunctivae normal.   Neck:      Trachea: Trachea normal.   Cardiovascular:      Rate and Rhythm: Normal rate and regular rhythm.      Pulses:           Radial pulses are 2+ on the right side and 2+ on the left side.   Pulmonary:      Effort: Pulmonary effort is normal. No respiratory distress.      Breath sounds: No stridor. No wheezing.   Chest:      Chest wall: No tenderness.   Abdominal:      General: Bowel sounds are normal. There is no distension.      Palpations: Abdomen is soft. There is no fluid wave, hepatomegaly or mass.      Tenderness: There is no abdominal tenderness. There is no guarding or rebound.   Musculoskeletal:         General: No tenderness or deformity.      Cervical back: Full passive " range of motion without pain and neck supple. No tenderness.      Right lower leg: No edema.      Left lower leg: No edema.   Lymphadenopathy:      Cervical: No cervical adenopathy.   Skin:     General: Skin is warm and dry.      Capillary Refill: Capillary refill takes less than 2 seconds.      Coloration: Skin is not cyanotic, jaundiced or pale.   Neurological:      General: No focal deficit present.      Mental Status: She is alert and oriented to person, place, and time.      Motor: No tremor.   Psychiatric:         Attention and Perception: Attention normal.         Mood and Affect: Mood and affect normal.         Speech: Speech normal.         Behavior: Behavior normal. Behavior is cooperative.            All of the data above and below has been reviewed by myself and any further interpretations will be reflected in the assessment and plan.   The data includes review of external notes, and independent interpretation of lab results, procedures, x-rays, and imaging reports.      Assessment:  Irritable bowel syndrome with constipation    Colon cancer screening  -     Ambulatory Referral to External Surgery    Due for Colonsocopy for CRC screening.   Will try linzess for IBS-C. Discussed limitation of stimulant laxatives. Only use as needed.     Recommendations:    Schedule colonoscopy with Dr. George.  Stop ozempic 1 week before procedure.  Linzess 72mcg and 145mc given to patient. Let us know which works well for you.       Risks, benefits, and alternatives of medical management, any associated procedures, and/or treatment discussed with the patient. Patient given opportunity to ask questions and voices understanding. Patient has elected to proceed with the recommended care modalities as discussed.    Follow up in about 1 year (around 12/28/2024).    Order summary:  Orders Placed This Encounter   Procedures    Ambulatory Referral to External Surgery        Instructed patient to notify my office if they have not  been contacted within two weeks after any procedures, submitting any samples (biopsies, blood, stool, urine, etc.) or after any imaging (X-ray, CT, MRI, etc.).      Ginette Hernandez NP    This document may have been created using a voice recognition transcribing system. Incorrect words or phrases may have been missed during proofreading. Please interpret accordingly or contact me for clarification.

## 2023-12-28 ENCOUNTER — OFFICE VISIT (OUTPATIENT)
Dept: GASTROENTEROLOGY | Facility: CLINIC | Age: 53
End: 2023-12-28
Payer: COMMERCIAL

## 2023-12-28 VITALS
OXYGEN SATURATION: 98 % | HEIGHT: 62 IN | WEIGHT: 138 LBS | SYSTOLIC BLOOD PRESSURE: 132 MMHG | BODY MASS INDEX: 25.4 KG/M2 | HEART RATE: 88 BPM | DIASTOLIC BLOOD PRESSURE: 86 MMHG

## 2023-12-28 DIAGNOSIS — Z12.11 COLON CANCER SCREENING: ICD-10-CM

## 2023-12-28 DIAGNOSIS — K58.1 IRRITABLE BOWEL SYNDROME WITH CONSTIPATION: Primary | ICD-10-CM

## 2023-12-28 PROCEDURE — 1160F RVW MEDS BY RX/DR IN RCRD: CPT | Mod: CPTII,S$GLB,, | Performed by: NURSE PRACTITIONER

## 2023-12-28 PROCEDURE — 1159F MED LIST DOCD IN RCRD: CPT | Mod: CPTII,S$GLB,, | Performed by: NURSE PRACTITIONER

## 2023-12-28 PROCEDURE — 99203 OFFICE O/P NEW LOW 30 MIN: CPT | Mod: S$GLB,,, | Performed by: NURSE PRACTITIONER

## 2023-12-28 PROCEDURE — 99203 PR OFFICE/OUTPT VISIT, NEW, LEVL III, 30-44 MIN: ICD-10-PCS | Mod: S$GLB,,, | Performed by: NURSE PRACTITIONER

## 2023-12-28 PROCEDURE — 3008F BODY MASS INDEX DOCD: CPT | Mod: CPTII,S$GLB,, | Performed by: NURSE PRACTITIONER

## 2023-12-28 PROCEDURE — 3008F PR BODY MASS INDEX (BMI) DOCUMENTED: ICD-10-PCS | Mod: CPTII,S$GLB,, | Performed by: NURSE PRACTITIONER

## 2023-12-28 PROCEDURE — 3075F SYST BP GE 130 - 139MM HG: CPT | Mod: CPTII,S$GLB,, | Performed by: NURSE PRACTITIONER

## 2023-12-28 PROCEDURE — 3079F PR MOST RECENT DIASTOLIC BLOOD PRESSURE 80-89 MM HG: ICD-10-PCS | Mod: CPTII,S$GLB,, | Performed by: NURSE PRACTITIONER

## 2023-12-28 PROCEDURE — 3075F PR MOST RECENT SYSTOLIC BLOOD PRESS GE 130-139MM HG: ICD-10-PCS | Mod: CPTII,S$GLB,, | Performed by: NURSE PRACTITIONER

## 2023-12-28 PROCEDURE — 3079F DIAST BP 80-89 MM HG: CPT | Mod: CPTII,S$GLB,, | Performed by: NURSE PRACTITIONER

## 2023-12-28 PROCEDURE — 1160F PR REVIEW ALL MEDS BY PRESCRIBER/CLIN PHARMACIST DOCUMENTED: ICD-10-PCS | Mod: CPTII,S$GLB,, | Performed by: NURSE PRACTITIONER

## 2023-12-28 PROCEDURE — 1159F PR MEDICATION LIST DOCUMENTED IN MEDICAL RECORD: ICD-10-PCS | Mod: CPTII,S$GLB,, | Performed by: NURSE PRACTITIONER

## 2023-12-28 NOTE — PATIENT INSTRUCTIONS
Schedule colonoscopy with Dr. George.  Stop ozempic 1 week before procedure.  Linzess 72mcg and 145mc given to patient. Let us know which works well for you.     Please notify my office if you have not been contacted within two weeks after any procedures, submitting any samples (biopsies, blood, stool, urine, etc.) or after any imaging (X-ray, CT, MRI, etc.).

## 2024-03-11 ENCOUNTER — TELEPHONE (OUTPATIENT)
Dept: GASTROENTEROLOGY | Facility: CLINIC | Age: 54
End: 2024-03-11
Payer: COMMERCIAL

## 2024-03-11 VITALS — WEIGHT: 138 LBS | HEIGHT: 62 IN | BODY MASS INDEX: 25.4 KG/M2

## 2024-03-11 DIAGNOSIS — Z12.11 COLON CANCER SCREENING: Primary | ICD-10-CM

## 2024-03-11 NOTE — TELEPHONE ENCOUNTER
S/w pt and told her that I was calling as a courtesy regarding her upcoming COLON with NBP on 3/19/24, Tues, and wanted to verify that she had her paper prep instructions and meds. Pt stated she has both. I reminded pt not to take her Ozempic injection this week to wait until after procedure to resume. Pt acknowledge she understood. I also mentioned that [facility] will call the day before (MON) with the arrival time, GI lab is located on the third floor, and to pre-register before next Tues. [email or web address]

## 2024-03-11 NOTE — TELEPHONE ENCOUNTER
"[facility]  [address] [place] [address]  Phone: [phone]  Fax: [phone]    History & Physical         Provider: Dr. Loyda George    Patient Name: Cindy RUELAS (age):1970  53 y.o.           Gender: female   Phone: [phone]     Referring Physician: Amara Aranda (Inactive)     Vital Signs:   Height - 5' 2"  Weight - 138 lb  BMI -  25.24    Plan: Colonoscopy @ [facility]    Encounter Diagnosis   Name Primary?    Colon cancer screening Yes           History:      Past Medical History:   Diagnosis Date    BMI 24.14 2023    BRCA2 gene mutation positive     [id number]    Disorder of thyroid, unspecified     Fever blister     Hormone imbalance     IBS (irritable bowel syndrome)     Mixed hyperlipidemia     Neck pain     Pre-diabetes       Past Surgical History:   Procedure Laterality Date    APPENDECTOMY      AUGMENTATION OF BREAST      and in  - one had ruptured    BREAST SURGERY      COLONOSCOPY      FOOT SURGERY Bilateral     HYSTERECTOMY      partial    rotoar cuff & biceps repair Right 2022    tumor on face         Medication List with Changes/Refills   Current Medications    ESTROGENS, CONJUGATED, (PREMARIN) 0.3 MG TABLET    Take 0.3 mg by mouth once daily.    MELOXICAM (MOBIC) 15 MG TABLET    TAKE 1 TABLET BY MOUTH ONCE DAILY WITH MEALS    NP THYROID 90 MG TAB    TAKE 1 TABLET BY MOUTH IN THE MORNING ON AN EMPTY STOMACH    PROGESTERONE (PROMETRIUM) 200 MG CAPSULE    Take 200 mg by mouth every evening. take at bedtime    SEMAGLUTIDE (OZEMPIC) 1 MG/DOSE (2 MG/1.5 ML) PNIJ    Inject 10 mg into the skin every 7 days.    SPIRONOLACTONE (ALDACTONE) 50 MG TABLET    Take 50 mg by mouth.    TESTOSTERONE 100 MG PELLET    Inject 100 mg into the muscle.    VALACYCLOVIR (VALTREX) 1000 MG TABLET          Review of patient's allergies indicates:   Allergen " Reactions    Valium [diazepam] Other (See Comments)     Critical she states she flipped out       Family History   Problem Relation Age of Onset    Lung cancer Mother     Heart attack Father     No Known Problems Sister     No Known Problems Sister     No Known Problems Sister     No Known Problems Sister     No Known Problems Sister     No Known Problems Sister     No Known Problems Brother     No Known Problems Brother     No Known Problems Brother     No Known Problems Brother     Breast cancer Maternal Uncle     Breast cancer Maternal Grandmother     Ovarian cancer Maternal Grandmother     Breast cancer Maternal Grandfather     Prostate cancer Maternal Grandfather     Ovarian cancer Paternal Grandmother     No Known Problems Paternal Grandfather       Social History     Tobacco Use    Smoking status: Never    Smokeless tobacco: Never   Substance Use Topics    Alcohol use: Yes     Comment: seldomly    Drug use: Never        Physical Examination:     General Appearance:___________________________  HEENT: _____________________________________  Abdomen:____________________________________  Heart:________________________________________  Lungs:_______________________________________  Extremities:___________________________________  Skin:_________________________________________  Endocrine:____________________________________  Genitourinary:_________________________________  Neurological:__________________________________      Patient has been evaluated immediately prior to sedation and is medically cleared for endoscopy with IVCS as an ASA class: ______      Physician Signature: _________________________       Date: ________  Time: ________

## 2024-03-19 ENCOUNTER — OUTSIDE PLACE OF SERVICE (OUTPATIENT)
Dept: GASTROENTEROLOGY | Facility: CLINIC | Age: 54
End: 2024-03-19

## 2024-03-19 ENCOUNTER — TELEPHONE (OUTPATIENT)
Dept: GASTROENTEROLOGY | Facility: CLINIC | Age: 54
End: 2024-03-19

## 2024-03-19 LAB — CRC RECOMMENDATION EXT: NORMAL

## 2024-03-19 PROCEDURE — G0121 COLON CA SCRN NOT HI RSK IND: HCPCS | Mod: ,,, | Performed by: INTERNAL MEDICINE

## 2024-03-19 NOTE — TELEPHONE ENCOUNTER
Per Dr. George, patient will come  Linzess 290 mcg samples. Samples pulled and put in sample room for patient.   MLC

## 2024-03-26 ENCOUNTER — TELEPHONE (OUTPATIENT)
Dept: GASTROENTEROLOGY | Facility: CLINIC | Age: 54
End: 2024-03-26
Payer: COMMERCIAL

## 2024-03-26 DIAGNOSIS — K58.1 IRRITABLE BOWEL SYNDROME WITH CONSTIPATION: Primary | ICD-10-CM

## 2024-03-26 NOTE — TELEPHONE ENCOUNTER
Spoke to pt. She is on her second bottle and states that she believes it is working better than the other dose. She will continue taking them and update us once complete. -kg

## 2024-04-11 NOTE — TELEPHONE ENCOUNTER
Spoke with patient. She is requesting Linzess 290 mcg be sent to pharmacy - [facility] on [address]

## 2024-04-11 NOTE — TELEPHONE ENCOUNTER
Message  Received: Today   Pt Advice  Steph Nascimento Staff  Caller: PT (Today,  9:08 AM)  Type:  Patient Requesting Rx From Samples    Who Called:Cindy Noova  Does the patient know what this is regarding?:Linzess  Would the patient rather a call back or a response via [email or web address]? Call back  Best Call Back Number:[phone]  Additional Information: Patient states she is on the third strength of dosage with samples.

## 2024-04-16 ENCOUNTER — DOCUMENTATION ONLY (OUTPATIENT)
Dept: GASTROENTEROLOGY | Facility: CLINIC | Age: 54
End: 2024-04-16
Payer: COMMERCIAL

## 2024-12-12 ENCOUNTER — TELEPHONE (OUTPATIENT)
Dept: GASTROENTEROLOGY | Facility: CLINIC | Age: 54
End: 2024-12-12

## 2024-12-12 NOTE — TELEPHONE ENCOUNTER
----- Message from Millicent sent at 12/12/2024 10:51 AM CST -----  Regarding: cancel and reschedule  Contact: pt  Pt calling to cancel and want to reschedule appt and can be reached at [phone].  Pt not able to get off work early.    Thanks,

## 2025-04-09 DIAGNOSIS — K58.1 IRRITABLE BOWEL SYNDROME WITH CONSTIPATION: ICD-10-CM

## 2025-04-10 RX ORDER — LINACLOTIDE 290 UG/1
290 CAPSULE, GELATIN COATED ORAL
Qty: 90 CAPSULE | Refills: 0 | Status: SHIPPED | OUTPATIENT
Start: 2025-04-10

## 2025-04-21 ENCOUNTER — TELEPHONE (OUTPATIENT)
Dept: GASTROENTEROLOGY | Facility: CLINIC | Age: 55
End: 2025-04-21
Payer: COMMERCIAL

## 2025-04-21 NOTE — TELEPHONE ENCOUNTER
----- Message from Trina sent at 4/21/2025 10:56 AM CDT -----  Contact: self  Type:  Patient Returning CallWho Called:Cindy Ott Left Message for Patient:unsureDoes the patient know what this is regarding?:appt/rescheduleWould the patient rather a call back or a response via [email or web address]? [facility] Call Back Number:[phone] Information: n/a

## 2025-04-21 NOTE — TELEPHONE ENCOUNTER
----- Message from Kambit sent at 4/21/2025 10:30 AM CDT -----  Contact: BON ALVARES [[id number]]  ..Type:  Patient Requesting CallWho Called:BON ALVARES [[id number]]Would the patient rather a call back or a response via [facility]? [facility] Call Back Number:.[phone] (home) Additional Information: Patient called to reschedule appointment - time no longer works

## 2025-05-29 ENCOUNTER — OFFICE VISIT (OUTPATIENT)
Dept: GASTROENTEROLOGY | Facility: CLINIC | Age: 55
End: 2025-05-29
Payer: COMMERCIAL

## 2025-05-29 ENCOUNTER — TELEPHONE (OUTPATIENT)
Dept: GASTROENTEROLOGY | Facility: CLINIC | Age: 55
End: 2025-05-29

## 2025-05-29 VITALS
HEIGHT: 62 IN | WEIGHT: 141 LBS | DIASTOLIC BLOOD PRESSURE: 79 MMHG | HEART RATE: 86 BPM | SYSTOLIC BLOOD PRESSURE: 129 MMHG | BODY MASS INDEX: 25.95 KG/M2 | OXYGEN SATURATION: 97 %

## 2025-05-29 DIAGNOSIS — Z15.09 BRCA GENE MUTATION POSITIVE: ICD-10-CM

## 2025-05-29 DIAGNOSIS — Z12.12 SCREENING FOR COLORECTAL CANCER: ICD-10-CM

## 2025-05-29 DIAGNOSIS — K58.1 IRRITABLE BOWEL SYNDROME WITH CONSTIPATION: Primary | ICD-10-CM

## 2025-05-29 DIAGNOSIS — Z15.01 BRCA GENE MUTATION POSITIVE: ICD-10-CM

## 2025-05-29 DIAGNOSIS — R32 URINARY INCONTINENCE, UNSPECIFIED TYPE: ICD-10-CM

## 2025-05-29 DIAGNOSIS — Z12.11 SCREENING FOR COLORECTAL CANCER: ICD-10-CM

## 2025-05-29 PROCEDURE — 3008F BODY MASS INDEX DOCD: CPT | Mod: CPTII,,, | Performed by: INTERNAL MEDICINE

## 2025-05-29 PROCEDURE — 1159F MED LIST DOCD IN RCRD: CPT | Mod: CPTII,,, | Performed by: INTERNAL MEDICINE

## 2025-05-29 PROCEDURE — 99213 OFFICE O/P EST LOW 20 MIN: CPT | Mod: S$PBB,,, | Performed by: INTERNAL MEDICINE

## 2025-05-29 PROCEDURE — 1160F RVW MEDS BY RX/DR IN RCRD: CPT | Mod: CPTII,,, | Performed by: INTERNAL MEDICINE

## 2025-05-29 PROCEDURE — 3074F SYST BP LT 130 MM HG: CPT | Mod: CPTII,,, | Performed by: INTERNAL MEDICINE

## 2025-05-29 PROCEDURE — 3078F DIAST BP <80 MM HG: CPT | Mod: CPTII,,, | Performed by: INTERNAL MEDICINE

## 2025-05-29 RX ORDER — ACETAMINOPHEN 500 MG
5000 TABLET ORAL
COMMUNITY

## 2025-05-29 RX ORDER — TENAPANOR HYDROCHLORIDE 53.2 MG/1
50 TABLET ORAL 2 TIMES DAILY WITH MEALS
Qty: 18 TABLET | Refills: 0 | COMMUNITY
Start: 2025-05-29

## 2025-05-29 RX ORDER — CYANOCOBALAMIN 1000 UG/ML
INJECTION, SOLUTION INTRAMUSCULAR; SUBCUTANEOUS
COMMUNITY
Start: 2025-05-20

## 2025-05-29 RX ORDER — IBUPROFEN 100 MG/5ML
1000 SUSPENSION, ORAL (FINAL DOSE FORM) ORAL
COMMUNITY

## 2025-05-29 NOTE — PROGRESS NOTES
Clinic Note    Reason for visit:  The primary encounter diagnosis was Irritable bowel syndrome with constipation. Diagnoses of Urinary incontinence, unspecified type, BRCA gene mutation positive, and Screening for colorectal cancer were also pertinent to this visit.    PCP: Amara Aranda (Inactive)       HPI:  This is a 54 y.o. female here for follow up. Patient with IBS-C on Linzess 290 mcg daily. Started taking vegetable laxatives again with the Linzess in 2/2025. She still has times that she feels her food sits in her stomach. Having more heartburn in the past month. Had surgery with Dr. Mcwilliams on 3/26/2025 to repair bladder mesh and told had vaginal and anal prolapse. In 2007 she had hysterectomy and Dr. Mcwilliams told her she no longer had pelvic ligaments which was why everything was dropping. No blood in stool. Has not been to PFT.    Has OV with [facility] tomorrow for pancreatic cancer screening.     Besides Linzess, she has taken OTC stool softeners, vegetable laxatives, senna, and dulcolax.    Colonoscopy 3/19/2024: IH, normal TI and colonic mucosa otherwise. Repeat in 10 years.    Review of Systems   Constitutional:  Negative for fatigue, fever and unexpected weight change.   HENT:  Negative for mouth sores, postnasal drip, sore throat and trouble swallowing.    Eyes:  Negative for pain, discharge and eye dryness.   Respiratory:  Negative for apnea, cough, choking, chest tightness, shortness of breath and wheezing.    Cardiovascular:  Negative for chest pain, palpitations and leg swelling.   Gastrointestinal:  Positive for constipation and reflux. Negative for abdominal distention, abdominal pain, anal bleeding, blood in stool, change in bowel habit, diarrhea, nausea, rectal pain, vomiting and fecal incontinence.   Genitourinary:  Negative for bladder incontinence, dysuria and hematuria.   Musculoskeletal:  Negative for arthralgias, back pain and joint swelling.   Integumentary:  Negative for color change  and rash.   Allergic/Immunologic: Negative for environmental allergies and food allergies.   Neurological:  Negative for seizures and headaches.   Hematological:  Negative for adenopathy. Does not bruise/bleed easily.        Past Medical History:   Diagnosis Date    BMI 24.14 09/2023    BMI 25.0-25.9,adult 05/29/2025    BRCA2 gene mutation positive     [id number]    Disorder of thyroid, unspecified     Fever blister     Hormone imbalance     IBS (irritable bowel syndrome)     Incomplete uterovaginal prolapse     Mixed hyperlipidemia     Neck pain     Pre-diabetes      Past Surgical History:   Procedure Laterality Date    APPENDECTOMY  1987    AUGMENTATION OF BREAST  2001    and in 2009 - one had ruptured    BREAST SURGERY      COLONOSCOPY      FOOT SURGERY Bilateral 1987    HYSTERECTOMY  2007    partial    rotoar cuff & biceps repair Right 08/2022    tumor on face   2005     Family History   Problem Relation Name Age of Onset    Lung cancer Mother      Heart attack Father      No Known Problems Sister      No Known Problems Sister      No Known Problems Sister      No Known Problems Sister      No Known Problems Sister      No Known Problems Sister      No Known Problems Brother      No Known Problems Brother      No Known Problems Brother      No Known Problems Brother      Pancreatic cancer Maternal Aunt      Breast cancer Maternal Uncle      Breast cancer Maternal Grandmother      Ovarian cancer Maternal Grandmother      Breast cancer Maternal Grandfather      Prostate cancer Maternal Grandfather      Ovarian cancer Paternal Grandmother      No Known Problems Paternal Grandfather      Colon cancer Neg Hx      Esophageal cancer Neg Hx      Liver cancer Neg Hx      Liver disease Neg Hx      Rectal cancer Neg Hx      Stomach cancer Neg Hx       Social History[1]  Review of patient's allergies indicates:   Allergen Reactions    Valium [diazepam] Other (See Comments)     Critical she states she flipped out      "    Medication List with Changes/Refills   New Medications    TENAPANOR (IBSRELA) 50 MG TAB    Take 50 mg by mouth 2 (two) times daily with meals.   Current Medications    ASCORBIC ACID, VITAMIN C, (VITAMIN C) 1000 MG TABLET    Take 1,000 mg by mouth.    CHOLECALCIFEROL, VITAMIN D3, 125 MCG (5,000 UNIT) TAB    Take 5,000 Units by mouth.    CYANOCOBALAMIN 1,000 MCG/ML INJECTION        ESTROGENS, CONJUGATED, (PREMARIN) 0.3 MG TABLET    Take 0.3 mg by mouth once daily.    LINZESS 290 MCG CAP CAPSULE    TAKE 1 CAPSULE BY MOUTH ONCE DAILY BEFORE BREAKFAST    MELOXICAM (MOBIC) 15 MG TABLET    TAKE 1 TABLET BY MOUTH ONCE DAILY WITH MEALS    NP THYROID 90 MG TAB    TAKE 1 TABLET BY MOUTH IN THE MORNING ON AN EMPTY STOMACH    PROGESTERONE (PROMETRIUM) 200 MG CAPSULE    Take 200 mg by mouth every evening. take at bedtime    SEMAGLUTIDE (OZEMPIC) 1 MG/DOSE (2 MG/1.5 ML) PNIJ    Inject 10 mg into the skin every 7 days.    TESTOSTERONE 100 MG PELLET    Inject 100 mg into the muscle.    VALACYCLOVIR (VALTREX) 1000 MG TABLET       Discontinued Medications    SPIRONOLACTONE (ALDACTONE) 50 MG TABLET    Take 50 mg by mouth.         Vital Signs:  /79 (BP Location: Left arm, Patient Position: Sitting)   Pulse 86   Ht 5' 2" (1.575 m)   Wt 64 kg (141 lb)   SpO2 97%   BMI 25.79 kg/m²         Physical Exam  Vitals reviewed.   Constitutional:       General: She is awake. She is not in acute distress.     Appearance: Normal appearance. She is well-developed. She is not ill-appearing, toxic-appearing or diaphoretic.   HENT:      Head: Normocephalic and atraumatic.      Nose: Nose normal.      Mouth/Throat:      Mouth: Mucous membranes are moist.      Pharynx: Oropharynx is clear. No oropharyngeal exudate or posterior oropharyngeal erythema.   Eyes:      General: Lids are normal. Gaze aligned appropriately. No scleral icterus.        Right eye: No discharge.         Left eye: No discharge.      Conjunctiva/sclera: Conjunctivae " normal.   Neck:      Trachea: Trachea normal.   Cardiovascular:      Rate and Rhythm: Normal rate and regular rhythm.      Pulses:           Radial pulses are 2+ on the right side and 2+ on the left side.   Pulmonary:      Effort: Pulmonary effort is normal. No respiratory distress.      Breath sounds: No stridor. No wheezing.   Chest:      Chest wall: No tenderness.   Abdominal:      General: Bowel sounds are normal. There is no distension.      Palpations: Abdomen is soft. There is no fluid wave, hepatomegaly or mass.      Tenderness: There is no abdominal tenderness. There is no guarding or rebound.   Musculoskeletal:         General: No tenderness or deformity.      Cervical back: No tenderness.      Right lower leg: No edema.      Left lower leg: No edema.   Lymphadenopathy:      Cervical: No cervical adenopathy.   Skin:     General: Skin is warm and dry.      Capillary Refill: Capillary refill takes less than 2 seconds.      Coloration: Skin is not cyanotic, jaundiced or pale.   Neurological:      General: No focal deficit present.      Mental Status: She is alert and oriented to person, place, and time.      Motor: No tremor.   Psychiatric:         Attention and Perception: Attention normal.         Mood and Affect: Mood and affect normal.         Speech: Speech normal.         Behavior: Behavior normal. Behavior is cooperative.            All of the data above and below has been reviewed by myself and any further interpretations will be reflected in the assessment and plan.   The data includes review of external notes, and independent interpretation of lab results, procedures, x-rays, and imaging reports.      Assessment:  Irritable bowel syndrome with constipation  -     Ambulatory Referral/Consult to Physical Therapy  -     tenapanor (IBSRELA) 50 mg Tab; Take 50 mg by mouth 2 (two) times daily with meals.  Dispense: 18 tablet; Refill: 0    Urinary incontinence, unspecified type  -     Ambulatory  Referral/Consult to Physical Therapy    BRCA gene mutation positive    Screening for colorectal cancer      Has visit [facility] tomorrow to dicuss pancreatic cancer screening due to BRCA 2 mutation.  IBS-C,  Linzess 290mcg daily helps but still taking laxatives at times. Trial of Ibsrela. Samples given. Rec PFT given her h/o prolapse with repair. Squatty potty.  CRC screening due 2034    Recommendations:    Begin taking Ibsrela twice a day with food and stop Linzess. Notify my office if this works better than Linzess.   We will send a referral to pelvic floor therapy- Derrek Couch.   Begin using Squatty Potty with each restroom visit at home.       If any tests, procedures, or imaging has been ordered and you are not contacted to schedule within 1-2 weeks, then you may call the central scheduling department directly at [phone].     Risks, benefits, and alternatives of medical management, any associated procedures, and/or treatment discussed with the patient. Patient given opportunity to ask questions and voices understanding. Patient has elected to proceed with the recommended care modalities as discussed.    Instructed patient to notify my office if they have not been contacted within two weeks after any procedures, submitting any samples (biopsies, blood, stool, urine, etc.) or after any imaging (X-ray, CT, MRI, etc.).     Follow up in about 1 year (around 5/29/2026).    Order summary:  Orders Placed This Encounter   Procedures    Ambulatory Referral/Consult to Physical Therapy      This assessment, plan, and documentation was performed in collaboration with Bren Lozano NP.     This document may have been created using a voice recognition transcribing system. Incorrect words or phrases may have been missed during proofreading. Please interpret accordingly or contact me for clarification.     Loyda George MD         [1]   Social History  Tobacco Use    Smoking status: Never     Smokeless tobacco: Never   Substance Use Topics    Alcohol use: Yes     Comment: seldomly    Drug use: Never

## 2025-05-29 NOTE — PATIENT INSTRUCTIONS
Begin taking Ibsrela twice a day with food and stop Linzess. Notify my office if this works better than Linzess.   We will send a referral to pelvic floor therapy- Derrek Couch.   Begin using Squatty Potty with each restroom visit at home.

## 2025-05-29 NOTE — LETTER
May 29, 2025        Amara Aranda MD  [address] [address]             [facility]  [address] [address]  Phone: [phone]  Fax: [phone]   Patient: Cindy Novoa   MR Number: [id number]   YOB: 1970   Date of Visit: 5/29/2025       Dear Dr. Aranda:    Thank you for referring Cindy Novoa to me for evaluation. Attached you will find relevant portions of my assessment and plan of care.    If you have questions, please do not hesitate to call me. I look forward to following Cindy Novoa along with you.    Sincerely,      Loyda George MD            CC  No Recipients    Enclosure

## 2025-07-13 DIAGNOSIS — K58.1 IRRITABLE BOWEL SYNDROME WITH CONSTIPATION: ICD-10-CM

## 2025-07-16 NOTE — TELEPHONE ENCOUNTER
Refill request received for Linzess 290 mcg. At last OV she was given samples of Ibsrela to see if it worked better than Linzess. Did she try Ibsrela? Confirm she wants the refill of Linzess 290 mcg.  MLC

## 2025-07-17 RX ORDER — LINACLOTIDE 290 UG/1
290 CAPSULE, GELATIN COATED ORAL
Qty: 90 CAPSULE | Refills: 0 | Status: SHIPPED | OUTPATIENT
Start: 2025-07-17 | End: 2025-07-18 | Stop reason: SDUPTHER

## 2025-07-18 NOTE — TELEPHONE ENCOUNTER
Linzess 290 mcg sent to [facility] on [address].  [facility] [place]